# Patient Record
Sex: MALE | URBAN - METROPOLITAN AREA
[De-identification: names, ages, dates, MRNs, and addresses within clinical notes are randomized per-mention and may not be internally consistent; named-entity substitution may affect disease eponyms.]

---

## 2023-01-01 ENCOUNTER — HOSPITAL ENCOUNTER (INPATIENT)
Age: 88
LOS: 1 days | DRG: 907 | End: 2023-01-06
Attending: EMERGENCY MEDICINE | Admitting: SURGERY

## 2023-01-01 ENCOUNTER — APPOINTMENT (OUTPATIENT)
Dept: GENERAL RADIOLOGY | Age: 88
DRG: 907 | End: 2023-01-01

## 2023-01-01 ENCOUNTER — ANESTHESIA EVENT (OUTPATIENT)
Dept: OPERATING ROOM | Age: 88
End: 2023-01-01

## 2023-01-01 ENCOUNTER — ANESTHESIA (OUTPATIENT)
Dept: OPERATING ROOM | Age: 88
End: 2023-01-01

## 2023-01-01 VITALS — WEIGHT: 154.32 LBS
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
## 2023-01-01 DIAGNOSIS — I46.8 TRAUMATIC CARDIAC ARREST (HCC): Primary | ICD-10-CM

## 2023-01-01 PROCEDURE — 6360000002 HC RX W HCPCS

## 2023-01-01 PROCEDURE — 5A1935Z RESPIRATORY VENTILATION, LESS THAN 24 CONSECUTIVE HOURS: ICD-10-PCS | Performed by: STUDENT IN AN ORGANIZED HEALTH CARE EDUCATION/TRAINING PROGRAM

## 2023-01-01 PROCEDURE — P9073 PLATELETS PHERESIS PATH REDU: HCPCS

## 2023-01-01 PROCEDURE — 3600000005 HC SURGERY LEVEL 5 BASE: Performed by: SURGERY

## 2023-01-01 PROCEDURE — 1200000000 HC SEMI PRIVATE

## 2023-01-01 PROCEDURE — 2W44X5Z PACKING OF CHEST WALL USING PACKING MATERIAL: ICD-10-PCS | Performed by: STUDENT IN AN ORGANIZED HEALTH CARE EDUCATION/TRAINING PROGRAM

## 2023-01-01 PROCEDURE — 2709999900 HC NON-CHARGEABLE SUPPLY: Performed by: SURGERY

## 2023-01-01 PROCEDURE — 32160 OPEN CHEST HEART MASSAGE: CPT

## 2023-01-01 PROCEDURE — P9016 RBC LEUKOCYTES REDUCED: HCPCS

## 2023-01-01 PROCEDURE — 0BH17EZ INSERTION OF ENDOTRACHEAL AIRWAY INTO TRACHEA, VIA NATURAL OR ARTIFICIAL OPENING: ICD-10-PCS | Performed by: STUDENT IN AN ORGANIZED HEALTH CARE EDUCATION/TRAINING PROGRAM

## 2023-01-01 PROCEDURE — 0W9930Z DRAINAGE OF RIGHT PLEURAL CAVITY WITH DRAINAGE DEVICE, PERCUTANEOUS APPROACH: ICD-10-PCS | Performed by: STUDENT IN AN ORGANIZED HEALTH CARE EDUCATION/TRAINING PROGRAM

## 2023-01-01 PROCEDURE — 3600000015 HC SURGERY LEVEL 5 ADDTL 15MIN: Performed by: SURGERY

## 2023-01-01 PROCEDURE — 6810039000 HC L1 TRAUMA ALERT

## 2023-01-01 PROCEDURE — 32551 INSERTION OF CHEST TUBE: CPT

## 2023-01-01 PROCEDURE — 2580000003 HC RX 258

## 2023-01-01 PROCEDURE — 05HY33Z INSERTION OF INFUSION DEVICE INTO UPPER VEIN, PERCUTANEOUS APPROACH: ICD-10-PCS | Performed by: STUDENT IN AN ORGANIZED HEALTH CARE EDUCATION/TRAINING PROGRAM

## 2023-01-01 PROCEDURE — 92950 HEART/LUNG RESUSCITATION CPR: CPT

## 2023-01-01 PROCEDURE — 0BJK0ZZ INSPECTION OF RIGHT LUNG, OPEN APPROACH: ICD-10-PCS | Performed by: STUDENT IN AN ORGANIZED HEALTH CARE EDUCATION/TRAINING PROGRAM

## 2023-01-01 PROCEDURE — 74018 RADEX ABDOMEN 1 VIEW: CPT

## 2023-01-01 PROCEDURE — 86927 PLASMA FRESH FROZEN: CPT

## 2023-01-01 PROCEDURE — 3700000000 HC ANESTHESIA ATTENDED CARE: Performed by: SURGERY

## 2023-01-01 PROCEDURE — 2580000003 HC RX 258: Performed by: SURGERY

## 2023-01-01 PROCEDURE — 3700000001 HC ADD 15 MINUTES (ANESTHESIA): Performed by: SURGERY

## 2023-01-01 PROCEDURE — 2700000000 HC OXYGEN THERAPY PER DAY

## 2023-01-01 PROCEDURE — 94761 N-INVAS EAR/PLS OXIMETRY MLT: CPT

## 2023-01-01 PROCEDURE — 2500000003 HC RX 250 WO HCPCS

## 2023-01-01 PROCEDURE — 86900 BLOOD TYPING SEROLOGIC ABO: CPT

## 2023-01-01 PROCEDURE — 99285 EMERGENCY DEPT VISIT HI MDM: CPT

## 2023-01-01 PROCEDURE — 0W380ZZ CONTROL BLEEDING IN CHEST WALL, OPEN APPROACH: ICD-10-PCS | Performed by: STUDENT IN AN ORGANIZED HEALTH CARE EDUCATION/TRAINING PROGRAM

## 2023-01-01 PROCEDURE — P9017 PLASMA 1 DONOR FRZ W/IN 8 HR: HCPCS

## 2023-01-01 RX ORDER — BISACODYL 10 MG
10 SUPPOSITORY, RECTAL RECTAL DAILY
Status: DISCONTINUED | OUTPATIENT
Start: 2023-01-01 | End: 2023-01-01 | Stop reason: HOSPADM

## 2023-01-01 RX ORDER — ONDANSETRON 4 MG/1
4 TABLET, ORALLY DISINTEGRATING ORAL EVERY 8 HOURS PRN
Status: DISCONTINUED | OUTPATIENT
Start: 2023-01-01 | End: 2023-01-01 | Stop reason: HOSPADM

## 2023-01-01 RX ORDER — ONDANSETRON 2 MG/ML
4 INJECTION INTRAMUSCULAR; INTRAVENOUS EVERY 6 HOURS PRN
Status: DISCONTINUED | OUTPATIENT
Start: 2023-01-01 | End: 2023-01-01 | Stop reason: HOSPADM

## 2023-01-01 RX ORDER — SODIUM CHLORIDE 0.9 % (FLUSH) 0.9 %
5-40 SYRINGE (ML) INJECTION EVERY 12 HOURS SCHEDULED
Status: DISCONTINUED | OUTPATIENT
Start: 2023-01-01 | End: 2023-01-01 | Stop reason: HOSPADM

## 2023-01-01 RX ORDER — SODIUM CHLORIDE 9 MG/ML
INJECTION, SOLUTION INTRAVENOUS PRN
Status: DISCONTINUED | OUTPATIENT
Start: 2023-01-01 | End: 2023-01-01 | Stop reason: HOSPADM

## 2023-01-01 RX ORDER — MAGNESIUM HYDROXIDE 1200 MG/15ML
LIQUID ORAL CONTINUOUS PRN
Status: COMPLETED | OUTPATIENT
Start: 2023-01-01 | End: 2023-01-01

## 2023-01-01 RX ORDER — ROCURONIUM BROMIDE 10 MG/ML
INJECTION, SOLUTION INTRAVENOUS PRN
Status: DISCONTINUED | OUTPATIENT
Start: 2023-01-01 | End: 2023-01-01 | Stop reason: SDUPTHER

## 2023-01-01 RX ORDER — SODIUM PHOSPHATE, DIBASIC AND SODIUM PHOSPHATE, MONOBASIC 7; 19 G/133ML; G/133ML
1 ENEMA RECTAL DAILY PRN
Status: DISCONTINUED | OUTPATIENT
Start: 2023-01-01 | End: 2023-01-01 | Stop reason: HOSPADM

## 2023-01-01 RX ORDER — PROPOFOL 10 MG/ML
5-50 INJECTION, EMULSION INTRAVENOUS CONTINUOUS
Status: DISCONTINUED | OUTPATIENT
Start: 2023-01-01 | End: 2023-01-01 | Stop reason: HOSPADM

## 2023-01-01 RX ORDER — POLYETHYLENE GLYCOL 3350 17 G/17G
17 POWDER, FOR SOLUTION ORAL DAILY
Status: DISCONTINUED | OUTPATIENT
Start: 2023-01-01 | End: 2023-01-01 | Stop reason: HOSPADM

## 2023-01-01 RX ORDER — SUCCINYLCHOLINE/SOD CL,ISO/PF 100 MG/5ML
SYRINGE (ML) INTRAVENOUS PRN
Status: DISCONTINUED | OUTPATIENT
Start: 2023-01-01 | End: 2023-01-01 | Stop reason: SDUPTHER

## 2023-01-01 RX ORDER — SODIUM CHLORIDE, SODIUM LACTATE, POTASSIUM CHLORIDE, CALCIUM CHLORIDE 600; 310; 30; 20 MG/100ML; MG/100ML; MG/100ML; MG/100ML
INJECTION, SOLUTION INTRAVENOUS CONTINUOUS PRN
Status: DISCONTINUED | OUTPATIENT
Start: 2023-01-01 | End: 2023-01-01 | Stop reason: SDUPTHER

## 2023-01-01 RX ORDER — SODIUM CHLORIDE 0.9 % (FLUSH) 0.9 %
5-40 SYRINGE (ML) INJECTION PRN
Status: DISCONTINUED | OUTPATIENT
Start: 2023-01-01 | End: 2023-01-01 | Stop reason: HOSPADM

## 2023-01-01 RX ORDER — CALCIUM CHLORIDE 100 MG/ML
INJECTION INTRAVENOUS; INTRAVENTRICULAR PRN
Status: DISCONTINUED | OUTPATIENT
Start: 2023-01-01 | End: 2023-01-01 | Stop reason: SDUPTHER

## 2023-01-01 RX ORDER — SODIUM CHLORIDE 9 MG/ML
INJECTION, SOLUTION INTRAVENOUS CONTINUOUS PRN
Status: DISCONTINUED | OUTPATIENT
Start: 2023-01-01 | End: 2023-01-01 | Stop reason: SDUPTHER

## 2023-01-01 RX ORDER — EPINEPHRINE 1 MG/ML(1)
AMPUL (ML) INJECTION PRN
Status: DISCONTINUED | OUTPATIENT
Start: 2023-01-01 | End: 2023-01-01 | Stop reason: SDUPTHER

## 2023-01-01 RX ADMIN — SODIUM CHLORIDE: 9 INJECTION, SOLUTION INTRAVENOUS at 14:53

## 2023-01-01 RX ADMIN — EPINEPHRINE 1 MG: 0.1 INJECTION INTRACARDIAC; INTRAVENOUS at 15:01

## 2023-01-01 RX ADMIN — Medication 100 MG: at 15:10

## 2023-01-01 RX ADMIN — SODIUM BICARBONATE 100 MEQ: 84 INJECTION INTRAVENOUS at 14:59

## 2023-01-01 RX ADMIN — EPINEPHRINE 1 MG: 0.1 INJECTION INTRACARDIAC; INTRAVENOUS at 15:13

## 2023-01-01 RX ADMIN — VASOPRESSIN 0.04 UNITS/MIN: 20 INJECTION INTRAVENOUS at 15:05

## 2023-01-01 RX ADMIN — SODIUM CHLORIDE, POTASSIUM CHLORIDE, SODIUM LACTATE AND CALCIUM CHLORIDE: 600; 310; 30; 20 INJECTION, SOLUTION INTRAVENOUS at 14:51

## 2023-01-01 RX ADMIN — EPINEPHRINE 1 MG: 0.1 INJECTION INTRACARDIAC; INTRAVENOUS at 14:58

## 2023-01-01 RX ADMIN — EPINEPHRINE 1 MG: 0.1 INJECTION INTRACARDIAC; INTRAVENOUS at 15:06

## 2023-01-01 RX ADMIN — CALCIUM CHLORIDE 2 G: 100 INJECTION, SOLUTION INTRAVENOUS; INTRAVENTRICULAR at 15:02

## 2023-01-01 RX ADMIN — EPINEPHRINE 50 MCG/MIN: 1 INJECTION PARENTERAL at 15:01

## 2023-01-01 RX ADMIN — ROCURONIUM BROMIDE 50 MG: 10 INJECTION, SOLUTION INTRAVENOUS at 15:10

## 2023-01-01 RX ADMIN — CALCIUM CHLORIDE 1 G: 100 INJECTION, SOLUTION INTRAVENOUS; INTRAVENTRICULAR at 14:58
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
## 2023-01-06 NOTE — ED NOTES
1 unit uncrossed blood infusing via cammy rapid infusion     Margarito Villeda RN  01/06/23 9197
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
## 2023-01-06 NOTE — ED NOTES
1 unit uncrossed blood started on [place] rapid infuser  Blood verified with Writer and Viviana Hill RN     Nika Salazar RN  01/06/23 [id number]
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
## 2023-01-06 NOTE — ED NOTES
[place] rapid infuser being set up  1 unit uncrossed matched blood setting up to be infused     Nika Salazar RN  01/06/23 4789
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
## 2023-01-07 LAB
BLD PROD TYP BPU: NORMAL
BLOOD BANK BLOOD PRODUCT EXPIRATION DATE: NORMAL
BLOOD BANK ISBT PRODUCT BLOOD TYPE: 5100
BLOOD BANK ISBT PRODUCT BLOOD TYPE: 600
BLOOD BANK ISBT PRODUCT BLOOD TYPE: 600
BLOOD BANK ISBT PRODUCT BLOOD TYPE: 6200
BLOOD BANK ISBT PRODUCT BLOOD TYPE: 9500
BLOOD BANK ISBT PRODUCT BLOOD TYPE: 9500
BLOOD BANK PRODUCT CODE: NORMAL
BLOOD BANK UNIT TYPE AND RH: NORMAL
BPU ID: NORMAL
DISPENSE STATUS BLOOD BANK: NORMAL
TRANSFUSION STATUS: NORMAL
UNIT DIVISION: 0
UNIT ISSUE DATE/TIME: NORMAL
UNIT TAG COMMENT: NORMAL

## 2023-01-07 NOTE — ED PROVIDER NOTES
[facility]     Emergency Department     Faculty Attestation        I performed a history and physical examination of the patient and discussed management with the resident. I reviewed the residents note and agree with the documented findings and plan of care. Any areas of disagreement are noted on the chart. I was personally present for the key portions of any procedures. I have documented in the chart those procedures where I was not present during the key portions. I have reviewed the emergency nurses triage note. I agree with the chief complaint, past medical history, past surgical history, allergies, medications, social and family history as documented unless otherwise noted below. For mid-level providers such as nurse practitioners as well as physicians assistants:    I have personally seen and evaluated the patient. I find the patient's history and physical exam are consistent with NP/PA documentation. I agree with the care provided, treatment rendered, disposition, & follow-up plan. Additional findings are as noted. Vital Signs: Wt 154 lb 5.2 oz (70 kg)   PCP:  No primary care provider on file.     Pertinent Comments:           Critical Care  None          Vanna Ta MD    Attending Emergency Medicine Physician            John Contreras MD  01/07/23 7409

## (undated) DEVICE — TUBING, SUCTION, 9/32" X 20', STRAIGHT: Brand: MEDLINE INDUSTRIES, INC.

## (undated) DEVICE — PACK SURG ABD SVMMC

## (undated) DEVICE — DRESSING GRMCDL 6 12FR D1N CNTR HOLE 4MM ANTMCRBL PRTCTVE DI

## (undated) DEVICE — APPLICATOR MEDICATED 26 CC SOLUTION HI LT ORNG CHLORAPREP

## (undated) DEVICE — TOTAL TRAY, 16FR 10ML SIL FOLEY, URN: Brand: MEDLINE

## (undated) DEVICE — BLADE CLIPPER GEN PURP NS

## (undated) DEVICE — PLUG,CATHETER,DRAINAGE PROTECTOR,TUBE: Brand: MEDLINE

## (undated) DEVICE — GLOVE ORANGE PI 7   MSG9070

## (undated) DEVICE — GLOVE SURG SZ 6 THK91MIL LTX FREE SYN POLYISOPRENE ANTI

## (undated) DEVICE — NEPTUNE E-SEP SMOKE EVACUATION PENCIL, COATED, 70MM BLADE, PUSH BUTTON SWITCH: Brand: NEPTUNE E-SEP

## (undated) DEVICE — GLOVE SURG SZ 65 THK91MIL LTX FREE SYN POLYISOPRENE

## (undated) DEVICE — DRAPE, SLUSH XL, 44X66, STERILE: Brand: MEDLINE

## (undated) DEVICE — GLOVE ORANGE PI 8   MSG9080

## (undated) DEVICE — GLOVE ORANGE PI 8 1/2   MSG9085

## (undated) DEVICE — CONTAINER,SPECIMEN,4OZ,OR STRL: Brand: MEDLINE

## (undated) DEVICE — SPONGE LAP W18XL18IN WHT COT 4 PLY FLD STRUNG RADPQ DISP ST

## (undated) DEVICE — BLADE ES ELASTOMERIC COAT INSUL DURABLE BEND UPTO 90DEG

## (undated) DEVICE — COVER OR TBL W40XL90IN ABSRB STD AND GRIPPY BK SAHARA